# Patient Record
Sex: FEMALE | Race: BLACK OR AFRICAN AMERICAN | NOT HISPANIC OR LATINO | URBAN - METROPOLITAN AREA
[De-identification: names, ages, dates, MRNs, and addresses within clinical notes are randomized per-mention and may not be internally consistent; named-entity substitution may affect disease eponyms.]

---

## 2021-07-06 ENCOUNTER — EMERGENCY (EMERGENCY)
Facility: HOSPITAL | Age: 26
LOS: 1 days | Discharge: HOME | End: 2021-07-06
Attending: STUDENT IN AN ORGANIZED HEALTH CARE EDUCATION/TRAINING PROGRAM
Payer: COMMERCIAL

## 2021-07-06 VITALS
HEIGHT: 64 IN | WEIGHT: 160.06 LBS | SYSTOLIC BLOOD PRESSURE: 127 MMHG | DIASTOLIC BLOOD PRESSURE: 77 MMHG | HEART RATE: 70 BPM | TEMPERATURE: 98 F | RESPIRATION RATE: 18 BRPM | OXYGEN SATURATION: 100 %

## 2021-07-06 LAB
ALBUMIN SERPL ELPH-MCNC: 4.4 G/DL — SIGNIFICANT CHANGE UP (ref 3.3–5.2)
ALP SERPL-CCNC: 47 U/L — SIGNIFICANT CHANGE UP (ref 40–120)
ALT FLD-CCNC: 15 U/L — SIGNIFICANT CHANGE UP
ANION GAP SERPL CALC-SCNC: 10 MMOL/L — SIGNIFICANT CHANGE UP (ref 5–17)
AST SERPL-CCNC: 22 U/L — SIGNIFICANT CHANGE UP
BASOPHILS # BLD AUTO: 0.09 K/UL — SIGNIFICANT CHANGE UP (ref 0–0.2)
BASOPHILS NFR BLD AUTO: 0.9 % — SIGNIFICANT CHANGE UP (ref 0–2)
BILIRUB SERPL-MCNC: 0.6 MG/DL — SIGNIFICANT CHANGE UP (ref 0.4–2)
BLD GP AB SCN SERPL QL: SIGNIFICANT CHANGE UP
BUN SERPL-MCNC: 7.5 MG/DL — LOW (ref 8–20)
BURR CELLS BLD QL SMEAR: PRESENT — SIGNIFICANT CHANGE UP
CALCIUM SERPL-MCNC: 10.3 MG/DL — HIGH (ref 8.6–10.2)
CHLORIDE SERPL-SCNC: 101 MMOL/L — SIGNIFICANT CHANGE UP (ref 98–107)
CO2 SERPL-SCNC: 27 MMOL/L — SIGNIFICANT CHANGE UP (ref 22–29)
CREAT SERPL-MCNC: 0.64 MG/DL — SIGNIFICANT CHANGE UP (ref 0.5–1.3)
ELLIPTOCYTES BLD QL SMEAR: SLIGHT — SIGNIFICANT CHANGE UP
EOSINOPHIL # BLD AUTO: 0.09 K/UL — SIGNIFICANT CHANGE UP (ref 0–0.5)
EOSINOPHIL NFR BLD AUTO: 0.9 % — SIGNIFICANT CHANGE UP (ref 0–6)
GIANT PLATELETS BLD QL SMEAR: PRESENT — SIGNIFICANT CHANGE UP
GLUCOSE SERPL-MCNC: 98 MG/DL — SIGNIFICANT CHANGE UP (ref 70–99)
HCG SERPL-ACNC: 16 MIU/ML — HIGH
HCT VFR BLD CALC: 38.1 % — SIGNIFICANT CHANGE UP (ref 34.5–45)
HGB BLD-MCNC: 11.9 G/DL — SIGNIFICANT CHANGE UP (ref 11.5–15.5)
HYPOCHROMIA BLD QL: SLIGHT — SIGNIFICANT CHANGE UP
LYMPHOCYTES # BLD AUTO: 3.31 K/UL — HIGH (ref 1–3.3)
LYMPHOCYTES # BLD AUTO: 32.4 % — SIGNIFICANT CHANGE UP (ref 13–44)
MANUAL SMEAR VERIFICATION: SIGNIFICANT CHANGE UP
MCHC RBC-ENTMCNC: 26.9 PG — LOW (ref 27–34)
MCHC RBC-ENTMCNC: 31.2 GM/DL — LOW (ref 32–36)
MCV RBC AUTO: 86 FL — SIGNIFICANT CHANGE UP (ref 80–100)
MONOCYTES # BLD AUTO: 0.45 K/UL — SIGNIFICANT CHANGE UP (ref 0–0.9)
MONOCYTES NFR BLD AUTO: 4.4 % — SIGNIFICANT CHANGE UP (ref 2–14)
NEUTROPHILS # BLD AUTO: 5.66 K/UL — SIGNIFICANT CHANGE UP (ref 1.8–7.4)
NEUTROPHILS NFR BLD AUTO: 55.3 % — SIGNIFICANT CHANGE UP (ref 43–77)
OVALOCYTES BLD QL SMEAR: SLIGHT — SIGNIFICANT CHANGE UP
PLAT MORPH BLD: ABNORMAL
PLATELET # BLD AUTO: 330 K/UL — SIGNIFICANT CHANGE UP (ref 150–400)
POIKILOCYTOSIS BLD QL AUTO: SLIGHT — SIGNIFICANT CHANGE UP
POLYCHROMASIA BLD QL SMEAR: SLIGHT — SIGNIFICANT CHANGE UP
POTASSIUM SERPL-MCNC: 3.9 MMOL/L — SIGNIFICANT CHANGE UP (ref 3.5–5.3)
POTASSIUM SERPL-SCNC: 3.9 MMOL/L — SIGNIFICANT CHANGE UP (ref 3.5–5.3)
PROT SERPL-MCNC: 8 G/DL — SIGNIFICANT CHANGE UP (ref 6.6–8.7)
RBC # BLD: 4.43 M/UL — SIGNIFICANT CHANGE UP (ref 3.8–5.2)
RBC # FLD: 14.5 % — SIGNIFICANT CHANGE UP (ref 10.3–14.5)
RBC BLD AUTO: ABNORMAL
SODIUM SERPL-SCNC: 138 MMOL/L — SIGNIFICANT CHANGE UP (ref 135–145)
VARIANT LYMPHS # BLD: 6.1 % — HIGH (ref 0–6)
WBC # BLD: 10.23 K/UL — SIGNIFICANT CHANGE UP (ref 3.8–10.5)
WBC # FLD AUTO: 10.23 K/UL — SIGNIFICANT CHANGE UP (ref 3.8–10.5)

## 2021-07-06 PROCEDURE — 86901 BLOOD TYPING SEROLOGIC RH(D): CPT

## 2021-07-06 PROCEDURE — 99284 EMERGENCY DEPT VISIT MOD MDM: CPT | Mod: 25

## 2021-07-06 PROCEDURE — 84702 CHORIONIC GONADOTROPIN TEST: CPT

## 2021-07-06 PROCEDURE — 85025 COMPLETE CBC W/AUTO DIFF WBC: CPT

## 2021-07-06 PROCEDURE — 80053 COMPREHEN METABOLIC PANEL: CPT

## 2021-07-06 PROCEDURE — 86850 RBC ANTIBODY SCREEN: CPT

## 2021-07-06 PROCEDURE — 76817 TRANSVAGINAL US OBSTETRIC: CPT

## 2021-07-06 PROCEDURE — 99285 EMERGENCY DEPT VISIT HI MDM: CPT

## 2021-07-06 PROCEDURE — 76817 TRANSVAGINAL US OBSTETRIC: CPT | Mod: 26

## 2021-07-06 PROCEDURE — 76801 OB US < 14 WKS SINGLE FETUS: CPT | Mod: 26

## 2021-07-06 PROCEDURE — 76801 OB US < 14 WKS SINGLE FETUS: CPT

## 2021-07-06 PROCEDURE — 36415 COLL VENOUS BLD VENIPUNCTURE: CPT

## 2021-07-06 PROCEDURE — 86900 BLOOD TYPING SEROLOGIC ABO: CPT

## 2021-07-06 NOTE — ED PROVIDER NOTE - ATTENDING CONTRIBUTION TO CARE
24yo female with no pmh LMP ,  presents with vaginal bleeding for 1 day. Pt with some lower abd cramping, Pt denies fevers/chills, ha, loc, focal neuro deficits, cp/sob/palp, cough, n/v/d, urinary symptoms, recent travel and sick contacts.  Const: Awake, alert and oriented. In no acute distress. Well appearing.  HEENT: NC/AT. Moist mucous membranes.  Eyes: No scleral icterus. EOMI.  Neck:. Soft and supple. Full ROM without pain.  Cardiac: Regular rate and regular rhythm. +S1/S2. Peripheral pulses 2+ and symmetric. No LE edema.  Resp: Speaking in full sentences. No evidence of respiratory distress. No wheezes, rales or rhonchi.  Abd: Soft, non-distended. Normal bowel sounds in all 4 quadrants. No guarding or rebound.  Back: Spine midline and non-tender. No CVAT.  Skin: No rashes, abrasions or lacerations.  Lymph: No cervical lymphadenopathy.  Neuro: Awake, alert & oriented x 3. Moves all extremities symmetrically.  pt with very early preg vs spon  vs ectopic but unable to tell due to early low hcg, pt instructed in the importance of close follow up within 2 days if unable to return to ED, mother present and also understands importance of follow up

## 2021-07-06 NOTE — ED PROVIDER NOTE - NSFOLLOWUPINSTRUCTIONS_ED_ALL_ED_FT
- 48 hour follow up for repeat hCG.   - Please call today to schedule follow up appointment with OBGYN.   - Please bring all documentation from your ED visit to any related future follow up appointment.  - Please call to schedule follow up appointment with your primary care physician within 24-48 hours.  - Please seek immediate medical attention for any new/worsening, signs/symptoms, or concerns.    Feel better!       Vaginal Bleeding During Pregnancy, First Trimester       A small amount of bleeding from the vagina (spotting) is relatively common during early pregnancy. It usually stops on its own. Various things may cause bleeding or spotting during early pregnancy. Some bleeding may be related to the pregnancy, and some may not. In many cases, the bleeding is normal and is not a problem. However, bleeding can also be a sign of something serious. Be sure to tell your health care provider about any vaginal bleeding right away.  Some possible causes of vaginal bleeding during the first trimester include:  •Infection or inflammation of the cervix.      •Growths (polyps) on the cervix.      •Miscarriage or threatened miscarriage.      •Pregnancy tissue developing outside of the uterus (ectopic pregnancy).      •A mass of tissue developing in the uterus due to an egg being fertilized incorrectly (molar pregnancy).        Follow these instructions at home:    Activity     •Follow instructions from your health care provider about limiting your activity. Ask what activities are safe for you.      •If needed, make plans for someone to help with your regular activities.      • Do not have sex or orgasms until your health care provider says that this is safe.      General instructions     •Take over-the-counter and prescription medicines only as told by your health care provider.      •Pay attention to any changes in your symptoms.      • Do not use tampons or douche.      •Write down how many pads you use each day, how often you change pads, and how soaked (saturated) they are.      •If you pass any tissue from your vagina, save the tissue so you can show it to your health care provider.      •Keep all follow-up visits as told by your health care provider. This is important.        Contact a health care provider if:    •You have vaginal bleeding during any part of your pregnancy.      •You have cramps or labor pains.      •You have a fever.        Get help right away if:    •You have severe cramps in your back or abdomen.      •You pass large clots or a large amount of tissue from your vagina.      •Your bleeding increases.      •You feel light-headed or weak, or you faint.      •You have chills.      •You are leaking fluid or have a gush of fluid from your vagina.        Summary    •A small amount of bleeding (spotting) from the vagina is relatively common during early pregnancy.      •Various things may cause bleeding or spotting in early pregnancy.      •Be sure to tell your health care provider about any vaginal bleeding right away.      This information is not intended to replace advice given to you by your health care provider. Make sure you discuss any questions you have with your health care provider.

## 2021-07-06 NOTE — ED PROVIDER NOTE - OBJECTIVE STATEMENT
26 yo female no PMHx LMP: , , approx. 6 weeks GA presents to ED c/o vaginal bleeding x1 day. Passing dark clots, lower abdominal cramping. Over the course of the day changed 4 overnight pads. Pregnancy not desired. Has not had outpatient US. Self medicated with ibuprofen 600mg at 6pm. No further complaints at this time.   Denies abdominal surgeries, urinary sxms. 24 yo female no PMHx LMP: , , approx. 6 weeks GA presents to ED c/o vaginal bleeding x1 day. +Blood hCG positive at urgent care. Passing dark clots, lower abdominal cramping. Over the course of the day changed 4 overnight pads. Pregnancy not desired. Has not had outpatient US. Self medicated with ibuprofen 600mg at 6pm. No further complaints at this time.   Denies abdominal surgeries, urinary sxms.

## 2021-07-06 NOTE — ED PROVIDER NOTE - CLINICAL SUMMARY MEDICAL DECISION MAKING FREE TEXT BOX
24 yo female no PMHx LMP: , , approx. 6 weeks GA presents to ED c/o vaginal bleeding x1 day in setting of +hCG. 24 yo female no PMHx LMP: , , approx. 6 weeks GA presents to ED c/o vaginal bleeding x1 day in setting of +hCG. ED work up again demonstrating minimally elevated beta. US negative. Follow up as outpatient. 26 yo female no PMHx LMP: , , approx. 6 weeks GA presents to ED c/o vaginal bleeding x1 day in setting of +hCG. ED work up again demonstrating minimally elevated beta. US negative. Educated on acetaminophen only for pain. Follow up as outpatient.

## 2021-07-06 NOTE — ED PROVIDER NOTE - CARE PROVIDER_API CALL
Raoul Colon)  Obstetrics and Gynecology  [address], [address]  [place] [address]  Phone: [phone]  Fax: [phone]  Follow Up Time:

## 2021-07-06 NOTE — ED PROVIDER NOTE - PHYSICAL EXAMINATION
General: In NAD, non-toxic appearing; well nourished/developed.  Skin: No rashes or lesions. Warm, dry, color normal for race.   Head: Normocephalic/atraumatic.  Eyes: Sclera anicteric, conjunctivae clear b/l. No discharge. PERRLA, EOMI.  Neck: Supple, FROM.  Cardio: Rate and rhythm regular. No audible murmur, gallop, or rub.  Resp: Normal AP to lateral diameter, symmetrical excursion b/l. Breath sounds vesicular, symmetrical and without rales, rhonchi or wheezing b/l.  Abd: Non-distended. Soft, non-tender, no masses palpated. Mild suprapubic TTP. No rebound, guarding.  No CVA tenderness.  MSK: MAEx4. FROM.   Neuro: A&Ox3. No motor/sensory deficits.

## 2021-07-06 NOTE — ED PROVIDER NOTE - PATIENT PORTAL LINK FT
You can access the FollowMyHealth Patient Portal offered by [facility] by registering at the following website: [email or web address]. By joining [facility] [employer]’s FollowMyHealth portal, you will also be able to view your health information using other applications (apps) compatible with our system.